# Patient Record
Sex: MALE | Race: OTHER | URBAN - METROPOLITAN AREA
[De-identification: names, ages, dates, MRNs, and addresses within clinical notes are randomized per-mention and may not be internally consistent; named-entity substitution may affect disease eponyms.]

---

## 2023-06-15 ENCOUNTER — TELEPHONE (OUTPATIENT)
Dept: SURGERY | Facility: CLINIC | Age: 48
End: 2023-06-15

## 2023-06-15 NOTE — TELEPHONE ENCOUNTER
Called pt advising that his 7/7 appt needs to be reschedled due to dr being in [place]. PSR called 2 x's pt hung up on PSR as soon as I asked to speak to him.